# Patient Record
(demographics unavailable — no encounter records)

---

## 2024-10-08 NOTE — HISTORY OF PRESENT ILLNESS
[Sneakers] : chandrika [FreeTextEntry1] : Patient states pain began about 2 weeks ago, when she returned from trip in [place]. States pain has worsened since .  Denies injury to the foot.   Describes pain throbbing when no pressure is applied and has stabbing pain when applying pressure, pain is constant.  A1c: 6  Diagnosed with diabetes in 1999

## 2024-10-08 NOTE — ASSESSMENT
[FreeTextEntry1] : Discussed diagnosis and treatment with patient Discussed etiology of symptoms patient is experiencing Obtained/reviewed left foot xrays 3 views WB 10/8/24: plantar calcaneal enthesopathy noted. 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 No foreign body observed. No fracture Demonstrated proper stretching techniques with patient showing understanding Discussed proper RICE techniques to reduce inflammation and for pain control Discussed specific examples of over-the-counter inserts to control heel eversion and support the medial arch Discussed proper shoes (stiff heel counter and midsole with flexion at the 1st MTPJ) Rx: Celecoxib PO BID for 2 weeks, then as needed Discussed all risks, complications, and benefits of corticosteroid injection therapy.  Skin prepped with alcohol and ultrasound-guided corticosteroid injection Left heel with 1 cc of 0.5% Marcaine plain and 4 mg Dexamethasone Phosphate. Patient tolerated well.   Patient to return to the office in 3-4 weeks

## 2024-10-08 NOTE — PHYSICAL EXAM
[General Appearance - Alert] : alert [General Appearance - In No Acute Distress] : in no acute distress [Ankle Swelling (On Exam)] : present [Ankle Swelling On The Left] : of the left ankle [Ankle Swelling On The Right] : mild [2+] : left foot dorsalis pedis 2+ [Sensation] : the sensory exam was normal to light touch and pinprick [No Focal Deficits] : no focal deficits [Deep Tendon Reflexes (DTR)] : deep tendon reflexes were 2+ and symmetric [Motor Exam] : the motor exam was normal [Oriented To Time, Place, And Person] : oriented to person, place, and time [Impaired Insight] : insight and judgment were intact [Affect] : the affect was normal [Delayed in the Right Toes] : capillary refills normal in right toes [Delayed in the Left Toes] : capillary refills normal in the left toes [de-identified] : Pain on palpation at medial calcaneal tuberosity of Left.   Increased pain with the windless mechanism.  Flexible mid/hindfoot deformity noted foot passively corrected to plantigrade foot position Right Left.  Decreased range of motion in dorsiflexion Right Left. Muscle strength 5/5 all major muscle groups bilateral.  [FreeTextEntry1] : diffuse xerosis B/L LE no open lesion, no hyperkeratotic lesion

## 2024-10-08 NOTE — PHYSICAL EXAM
[General Appearance - Alert] : alert [General Appearance - In No Acute Distress] : in no acute distress [Ankle Swelling (On Exam)] : present [Ankle Swelling On The Left] : of the left ankle [Ankle Swelling On The Right] : mild [2+] : left foot dorsalis pedis 2+ [Sensation] : the sensory exam was normal to light touch and pinprick [No Focal Deficits] : no focal deficits [Deep Tendon Reflexes (DTR)] : deep tendon reflexes were 2+ and symmetric [Motor Exam] : the motor exam was normal [Oriented To Time, Place, And Person] : oriented to person, place, and time [Impaired Insight] : insight and judgment were intact [Affect] : the affect was normal [Delayed in the Right Toes] : capillary refills normal in right toes [Delayed in the Left Toes] : capillary refills normal in the left toes [de-identified] : Pain on palpation at medial calcaneal tuberosity of Left.   Increased pain with the windless mechanism.  Flexible mid/hindfoot deformity noted foot passively corrected to plantigrade foot position Right Left.  Decreased range of motion in dorsiflexion Right Left. Muscle strength 5/5 all major muscle groups bilateral.  [FreeTextEntry1] : diffuse xerosis B/L LE no open lesion, no hyperkeratotic lesion